# Patient Record
(demographics unavailable — no encounter records)

---

## 2025-03-12 NOTE — REVIEW OF SYSTEMS
[Depression] : depression [Negative] : Heme/Lymph [FreeTextEntry4] : allergies: xyzal, post nasal drip, [de-identified] : acne [de-identified] : on wellbutrin, adhd

## 2025-03-12 NOTE — HISTORY OF PRESENT ILLNESS
[Patient reported PAP Smear was normal] : Patient reported PAP Smear was normal [LMP unknown] : LMP unknown [Y] : Patient is sexually active [Currently Active] : currently active [Men] : men [Yes] : Yes [Patient refuses STI testing] : Patient refuses STI testing [FreeTextEntry1] : presents for a well women visit and GYN exam.  [PapSmeardate] : 2021 [No] : never pregnant [TextBox_6] : on cont pill [TextBox_9] : 15 [FreeTextEntry3] : BC

## 2025-03-12 NOTE — PHYSICAL EXAM

## 2025-03-12 NOTE — PLAN
[FreeTextEntry1] : annual: pap and hpv  on continuous pill Risks, benefits, alternative to combined oral contraceptives discussed. Patient told to carefully read package insert.  iud did not work for her : bad acne  hpv vaccine: got this